# Patient Record
Sex: FEMALE | URBAN - METROPOLITAN AREA
[De-identification: names, ages, dates, MRNs, and addresses within clinical notes are randomized per-mention and may not be internally consistent; named-entity substitution may affect disease eponyms.]

---

## 2023-10-03 ENCOUNTER — APPOINTMENT (OUTPATIENT)
Dept: URBAN - METROPOLITAN AREA CLINIC 195 | Age: 73
Setting detail: DERMATOLOGY
End: 2023-10-03

## 2023-10-03 VITALS — SYSTOLIC BLOOD PRESSURE: 130 MMHG | HEIGHT: 55 IN | WEIGHT: 180 LBS | DIASTOLIC BLOOD PRESSURE: 70 MMHG

## 2023-10-03 DIAGNOSIS — L57.8 OTHER SKIN CHANGES DUE TO CHRONIC EXPOSURE TO NONIONIZING RADIATION: ICD-10-CM

## 2023-10-03 DIAGNOSIS — Z85.828 PERSONAL HISTORY OF OTHER MALIGNANT NEOPLASM OF SKIN: ICD-10-CM

## 2023-10-03 DIAGNOSIS — Z12.83 ENCOUNTER FOR SCREENING FOR MALIGNANT NEOPLASM OF SKIN: ICD-10-CM

## 2023-10-03 PROBLEM — C44.319 BASAL CELL CARCINOMA OF SKIN OF OTHER PARTS OF FACE: Status: ACTIVE | Noted: 2023-10-03

## 2023-10-03 PROCEDURE — OTHER ASC CONSULTATION: OTHER

## 2023-10-03 PROCEDURE — OTHER SURGICAL DECISION MAKING: OTHER

## 2023-10-03 PROCEDURE — 13132 CMPLX RPR F/C/C/M/N/AX/G/H/F: CPT

## 2023-10-03 PROCEDURE — 17311 MOHS 1 STAGE H/N/HF/G: CPT

## 2023-10-03 PROCEDURE — 99203 OFFICE O/P NEW LOW 30 MIN: CPT | Mod: 25

## 2023-10-03 PROCEDURE — OTHER MOHS SURGERY: OTHER

## 2023-10-03 PROCEDURE — OTHER COUNSELING: OTHER

## 2023-10-03 PROCEDURE — OTHER MIPS QUALITY: OTHER

## 2023-10-03 ASSESSMENT — LOCATION DETAILED DESCRIPTION DERM: LOCATION DETAILED: RIGHT CENTRAL LATERAL NECK

## 2023-10-03 ASSESSMENT — LOCATION ZONE DERM: LOCATION ZONE: NECK

## 2023-10-03 ASSESSMENT — LOCATION SIMPLE DESCRIPTION DERM: LOCATION SIMPLE: NECK

## 2023-10-03 NOTE — PROCEDURE: MOHS SURGERY
Body Location Override (Optional - Billing Will Still Be Based On Selected Body Map Location If Applicable): R inf forehead / suprabrow

## 2023-10-03 NOTE — HPI: SKIN LESION (BASAL CELL CARCINOMA)
How Severe Is Your Skin Cancer?: mild
Is This A New Presentation, Or A Follow-Up?: Basal Cell Carcinoma
Location From Outside Provider (Will Override Previously Chosen Location): Right forehead/eyebrow

## 2023-10-03 NOTE — PROCEDURE: MIPS QUALITY
Name And Contact Information For Health Care Proxy: Fermin Russ (  ) [phone] Name And Contact Information For Health Care Proxy: Fermin Russ (  ) [phone]
